# Patient Record
Sex: FEMALE | Race: WHITE | NOT HISPANIC OR LATINO | URBAN - METROPOLITAN AREA
[De-identification: names, ages, dates, MRNs, and addresses within clinical notes are randomized per-mention and may not be internally consistent; named-entity substitution may affect disease eponyms.]

---

## 2023-11-28 ENCOUNTER — HOSPITAL ENCOUNTER (OUTPATIENT)
Dept: TELEMEDICINE | Facility: HOSPITAL | Age: 78
Discharge: HOME | End: 2023-11-28
Payer: MEDICARE

## 2023-11-28 DIAGNOSIS — G93.40 ENCEPHALOPATHY: Primary | ICD-10-CM

## 2023-11-28 PROCEDURE — G0427 INPT/ED TELECONSULT70: HCPCS | Mod: 95,,, | Performed by: STUDENT IN AN ORGANIZED HEALTH CARE EDUCATION/TRAINING PROGRAM

## 2023-11-28 PROCEDURE — G0427 PR INPT TELEHEALTH CON 70/>M: ICD-10-PCS | Mod: 95,,, | Performed by: STUDENT IN AN ORGANIZED HEALTH CARE EDUCATION/TRAINING PROGRAM

## 2023-11-28 NOTE — SUBJECTIVE & OBJECTIVE
HPI:  78 y.o. female with medical history of HTN, dementia - vascular, Afib not on AC - with admission concerns of generalized weakness. And Stroke code called in for the same.     History from family and medical records review. Report of generalized weakness - a day prior to ED arrival.  reported that patient was not able to mobilize after using commode / needed assistance with ambulation since then. Also, appears little confused / slow in responses with poor attention and concentraion. Otherwise No other focal motor or sensory or cranial nerve deficits. No similar events in the past. Otherwise no history of strokes, seizures or migraines.    Exam - awake / alert / following commands / no obvious aphasia / poor attention and concentration / no focal motor or cranial nerve deficits.       Images personally reviewed and interpreted:  Study: Head CT  Study Interpretation: As per report negative for acute findings      Assessment and plan:  Recs:  Suspect encephalopathy - undiagnosed metabolic / toxic / infectious triggers with confounders of dementia. Low suspicion for focal cerebrovascular ischemic event or epileptic or neuro inflammatory etiology.      - MRI brain wo contrast  - CTA head and neck     If imagings positive for infarct - consider   - DAPT X 21 day, ASA 81/Plavix 75, with asa thereafter  - target LDL < 70 / Continue atorvastatin  - euglycemic goals   - permissive HTN x 72 hrs post index event / long term < 140/90  - Echo f/u    - PT/OT recs - discharge planning   - F/u PCP for risk factor modification monitoring  -  on DASH diet; exercise and lifestyle modifications    Investigate and manage encephalopathy   -- Correct lytes as needed / control infection if any / avoid hypoxia or hypercapnia / avoid hypoglycemia   -- Correct any nutritional deficiencies / correct anemia / provide nutritional support as appropriate / ambulate when appropriate - PT/OT recs   -- delirium precautions       Lytics recommendation: Thrombolytic therapy not recommended due to Suspected stroke mimic   Thrombectomy recommendation: No; at this time symptoms not suggestive of large vessel occlusion  Placement recommendation: pending further studies  do not transfer

## 2023-11-28 NOTE — TELEMEDICINE CONSULT
[facility]  Vascular Neurology  Comprehensive Stroke Center  TeleVascular Neurology Acute Consultation Note        Consult Information  Consults    Consulting Provider: KRISTIN LAYNE   Current Providers  No providers found    Patient Location: [facility] ED - [place] [facility] Emergency Department    Spoke hospital nurse at bedside with patient assisting consultant.  Patient information was obtained from spouse/SO and primary team.       Stroke Documentation  Acute Stroke Times   Last Known Normal Date: 11/27/23  Last Known Normal Time: 1700  Symptom Onset Date: 11/27/23  Symptom Onset Time: 1700  Stroke Team Called Date: 11/28/23  Stroke Team Called Time: 1038  Stroke Team Arrival Date: 11/28/23  Stroke Team Arrival Time: 1045  CT completed but images not available for review - spoke to document results: 1050  Thrombolytic Therapy Recommended: No  Thrombectomy Recommended: No    NIH Scale:  1a. Level of Consciousness: 0-->Alert, keenly responsive  1b. LOC Questions: 1-->Answers one question correctly  1c. LOC Commands: 0-->Performs both tasks correctly  2. Best Gaze: 0-->Normal  3. Visual: 0-->No visual loss  4. Facial Palsy: 0-->Normal symmetrical movements  5a. Motor Arm, Left: 0-->No drift, limb holds 90 (or 45) degrees for full 10 secs  5b. Motor Arm, Right: 0-->No drift, limb holds 90 (or 45) degrees for full 10 secs  6a. Motor Leg, Left: 0-->No drift, leg holds 30 degree position for full 5 secs  6b. Motor Leg, Right: 0-->No drift, leg holds 30 degree position for full 5 secs  7. Limb Ataxia: 0-->Absent  8. Sensory: 0-->Normal, no sensory loss  9. Best Language: 0-->No aphasia, normal  10. Dysarthria: 0-->Normal  11. Extinction and Inattention (formerly Neglect): 0-->No abnormality  Total (NIH Stroke Scale): 1      Modified Yi: Score: 2  [place] Coma Scale: 14   ABCD2 Score:    [id number] Score: 7  HAS -BLED Score:    ICH Score:     Ramirez & Bell Classification:      There were no vitals taken for this visit.    Diagnoses  Problem Noted   Encephalopathy 11/28/2023       Medical Decision Making  HPI:  78 y.o. female with medical history of HTN, dementia - vascular, Afib not on AC - with admission concerns of generalized weakness. And Stroke code called in for the same.     History from family and medical records review. Report of generalized weakness - a day prior to ED arrival.  reported that patient was not able to mobilize after using commode / needed assistance with ambulation since then. Also, appears little confused / slow in responses with poor attention and concentraion. Otherwise No other focal motor or sensory or cranial nerve deficits. No similar events in the past. Otherwise no history of strokes, seizures or migraines.    Exam - awake / alert / following commands / no obvious aphasia / poor attention and concentration / no focal motor or cranial nerve deficits.       Images personally reviewed and interpreted:  Study: Head CT  Study Interpretation: As per report negative for acute findings      Assessment and plan:  Recs:  Suspect encephalopathy - undiagnosed metabolic / toxic / infectious triggers with confounders of dementia. Low suspicion for focal cerebrovascular ischemic event or epileptic or neuro inflammatory etiology.      - MRI brain wo contrast  - CTA head and neck     If imagings positive for infarct - consider   - DAPT X 21 day, ASA 81/Plavix 75, with asa thereafter  - target LDL < 70 / Continue atorvastatin  - euglycemic goals   - permissive HTN x 72 hrs post index event / long term < 140/90  - Echo f/u    - PT/OT recs - discharge planning   - F/u PCP for risk factor modification monitoring  -  on DASH diet; exercise and lifestyle modifications    Investigate and manage encephalopathy   -- Correct lytes as needed / control infection if any / avoid hypoxia or hypercapnia / avoid hypoglycemia   -- Correct  any nutritional deficiencies / correct anemia / provide nutritional support as appropriate / ambulate when appropriate - PT/OT recs   -- delirium precautions      Lytics recommendation: Thrombolytic therapy not recommended due to Suspected stroke mimic   Thrombectomy recommendation: No; at this time symptoms not suggestive of large vessel occlusion  Placement recommendation: pending further studies  do not transfer               Review of Systems   Constitutional:  Negative for chills and fever.   Respiratory:  Negative for cough.    Cardiovascular:  Negative for chest pain.   Neurological:  Positive for weakness. Negative for dizziness, sensory change, focal weakness and headaches.   Psychiatric/Behavioral:  Negative for depression.      Physical Exam  HENT:      Head: Normocephalic and atraumatic.   Eyes:      Extraocular Movements: Extraocular movements intact.   Pulmonary:      Effort: No respiratory distress.   Neurological:      Mental Status: She is alert.      Cranial Nerves: No cranial nerve deficit or facial asymmetry.      Sensory: No sensory deficit.      Motor: No weakness.       No past medical history on file.  No past surgical history on file.  No family history on file.        Gennaro Alvarez MD      Emergent/Acute neurological consultation requested by spoke provider due to critical concerns for possible cerebrovascular event that could result in permanent loss of neurologic/bodily function, severe disability or death of this patient.  Immediate/timely evaluation by a highly prepared expert is paramount for optimal outcomes  High risk for neurological deterioration if not properly diagnosed  High risk for neurological deterioration if not treated promplty/as soon as possible  Complex diagnostic evaluation may be required (advanced imaging)  High risk treatment options (thrombolytics and/or thrombectomy)    Patient care was coordinated with spoke provider, including but not limted  to    Discussing likely diagnosis/etiology of symptoms  Making recommendations for further diagnostic studies  Making recommendations for intravenous thrombolytics or other advanced therapies  Making recommendations for disposition (admission/transfer for higher level of care)